# Patient Record
Sex: FEMALE | Race: WHITE | URBAN - METROPOLITAN AREA
[De-identification: names, ages, dates, MRNs, and addresses within clinical notes are randomized per-mention and may not be internally consistent; named-entity substitution may affect disease eponyms.]

---

## 2019-09-25 ENCOUNTER — IMPORTED ENCOUNTER (OUTPATIENT)
Dept: URBAN - METROPOLITAN AREA CLINIC 50 | Facility: CLINIC | Age: 54
End: 2019-09-25

## 2021-01-08 ENCOUNTER — IMPORTED ENCOUNTER (OUTPATIENT)
Dept: URBAN - METROPOLITAN AREA CLINIC 50 | Facility: CLINIC | Age: 56
End: 2021-01-08

## 2021-04-17 ASSESSMENT — VISUAL ACUITY
OD_BAT: 20/20
OS_CC: J1
OD_CC: J1
OD_OTHER: 20/20. 20/30.
OD_CC: 20/20
OS_CC: 20/20
OS_BAT: 20/20
OS_OTHER: 20/20. 20/25.

## 2021-04-17 ASSESSMENT — TONOMETRY
OS_IOP_MMHG: 18
OD_IOP_MMHG: 18

## 2021-06-24 NOTE — PATIENT DISCUSSION
Cataract surgery has been performed in the first eye and activities of daily living are still impaired. The patient would like to proceed with cataract surgery in the second eye as scheduled. The patient elects [facility] OS, goal of Qian.

## 2021-06-24 NOTE — PATIENT DISCUSSION
Cataract surgery has been performed in the first eye and activities of daily living are still impaired. The patient would like to proceed with cataract surgery in the second eye as scheduled. The patient elects [facility], goal of Qian. Path confirms adenocarcinoma   Hem/Onc eval Path confirms adenocarcinoma   Hem/Onc eval  Pfts as OP. R/o underlying Copd

## 2022-01-12 ENCOUNTER — PREPPED CHART (OUTPATIENT)
Dept: URBAN - METROPOLITAN AREA CLINIC 49 | Facility: CLINIC | Age: 57
End: 2022-01-12

## 2022-01-14 ENCOUNTER — COMPREHENSIVE EXAM (OUTPATIENT)
Dept: URBAN - METROPOLITAN AREA CLINIC 49 | Facility: CLINIC | Age: 57
End: 2022-01-14

## 2022-01-14 DIAGNOSIS — H52.4: ICD-10-CM

## 2022-01-14 DIAGNOSIS — Z01.01: ICD-10-CM

## 2022-01-14 DIAGNOSIS — D31.31: ICD-10-CM

## 2022-01-14 DIAGNOSIS — H25.13: ICD-10-CM

## 2022-01-14 PROCEDURE — 92014 COMPRE OPH EXAM EST PT 1/>: CPT

## 2022-01-14 PROCEDURE — 92015 DETERMINE REFRACTIVE STATE: CPT

## 2022-01-14 ASSESSMENT — KERATOMETRY
OD_K2POWER_DIOPTERS: 43.00
OS_K1POWER_DIOPTERS: 43.25
OD_K1POWER_DIOPTERS: 42.50
OS_K2POWER_DIOPTERS: 43.50
OD_AXISANGLE_DEGREES: 91
OD_AXISANGLE2_DEGREES: 001
OS_AXISANGLE2_DEGREES: 072
OS_AXISANGLE_DEGREES: 162

## 2022-01-14 ASSESSMENT — VISUAL ACUITY
OS_SC: J1+@17"
OD_CC: 20/25
OS_CC: J16@17"
OS_CC: 20/20-2
OS_GLARE: 20/20
OD_GLARE: 20/25
OS_GLARE: 20/20
OD_SC: J1+@17"
OD_GLARE: 20/40
OD_CC: J2@17"

## 2022-01-14 ASSESSMENT — TONOMETRY
OD_IOP_MMHG: 14
OS_IOP_MMHG: 16

## 2022-05-04 ENCOUNTER — COMPREHENSIVE EXAM (OUTPATIENT)
Dept: URBAN - METROPOLITAN AREA CLINIC 53 | Facility: CLINIC | Age: 57
End: 2022-05-04

## 2022-05-04 DIAGNOSIS — H25.13: ICD-10-CM

## 2022-05-04 DIAGNOSIS — D31.31: ICD-10-CM

## 2022-05-04 PROCEDURE — 92250 FUNDUS PHOTOGRAPHY W/I&R: CPT

## 2022-05-04 PROCEDURE — 92014 COMPRE OPH EXAM EST PT 1/>: CPT

## 2022-05-04 ASSESSMENT — KERATOMETRY
OS_AXISANGLE2_DEGREES: 072
OS_K2POWER_DIOPTERS: 43.50
OD_AXISANGLE_DEGREES: 91
OD_K2POWER_DIOPTERS: 43.00
OD_AXISANGLE2_DEGREES: 001
OS_K1POWER_DIOPTERS: 43.25
OD_K1POWER_DIOPTERS: 42.50
OS_AXISANGLE_DEGREES: 162

## 2022-05-04 ASSESSMENT — VISUAL ACUITY
OS_PH: 20/40
OS_SC: 20/150
OU_SC: J1+ @16IN
OD_SC: 20/100
OD_PH: 20/40

## 2022-05-04 ASSESSMENT — TONOMETRY
OD_IOP_MMHG: 16
OS_IOP_MMHG: 16

## 2022-05-09 NOTE — PROCEDURE NOTE: CLINICAL
PROCEDURE NOTE: Excision of Eyelid Lesion Right Lower Lid. Diagnosis: Eyelid Lesion, Benign. Anesthesia: Topical. Prep: Betadine Scrub. Risks, benefits and alternatives discussed. Patient desires to proceed with excision of growth/lesion today. A drop of proparacaine was instilled in the involved eye. A tetracaine drop was used on a cotton tipped applicator and placed on the conjunctiva. The involved area was anesthetized using 1% lidocaine with epi. The lesion was excised using mini Westcotts and forceps and discarded. The wound was cauterized to achieve hemostasis. Erythromycin ophthalmic ointment was applied. Post op instructions were given to the patient. The patient tolerated the procedure well and left in good condition. The patient understands to call us for any concerns. Dami Emery

## 2023-01-09 ENCOUNTER — COMPREHENSIVE EXAM (OUTPATIENT)
Dept: URBAN - METROPOLITAN AREA CLINIC 53 | Facility: CLINIC | Age: 58
End: 2023-01-09

## 2023-01-09 DIAGNOSIS — H52.4: ICD-10-CM

## 2023-01-09 DIAGNOSIS — H25.13: ICD-10-CM

## 2023-01-09 DIAGNOSIS — Z01.01: ICD-10-CM

## 2023-01-09 PROCEDURE — 92014 COMPRE OPH EXAM EST PT 1/>: CPT

## 2023-01-09 PROCEDURE — 92015 DETERMINE REFRACTIVE STATE: CPT

## 2023-01-09 ASSESSMENT — KERATOMETRY
OS_K2POWER_DIOPTERS: 43.50
OD_AXISANGLE_DEGREES: 91
OS_AXISANGLE2_DEGREES: 072
OD_AXISANGLE2_DEGREES: 001
OD_K2POWER_DIOPTERS: 43.00
OS_AXISANGLE_DEGREES: 162
OD_K1POWER_DIOPTERS: 42.50
OS_K1POWER_DIOPTERS: 43.25

## 2023-01-09 ASSESSMENT — VISUAL ACUITY
OU_CC: J1+ @ 18 IN
OS_CC: 20/25
OU_CC: 20/20
OS_GLARE: 20/20
OD_GLARE: 20/20
OS_GLARE: 20/20
OD_GLARE: 20/20
OD_CC: 20/25

## 2023-01-09 ASSESSMENT — TONOMETRY
OD_IOP_MMHG: 14
OS_IOP_MMHG: 15

## 2024-01-15 ENCOUNTER — COMPREHENSIVE EXAM (OUTPATIENT)
Dept: URBAN - METROPOLITAN AREA CLINIC 53 | Facility: CLINIC | Age: 59
End: 2024-01-15

## 2024-01-15 DIAGNOSIS — D31.31: ICD-10-CM

## 2024-01-15 DIAGNOSIS — Z01.01: ICD-10-CM

## 2024-01-15 DIAGNOSIS — H52.4: ICD-10-CM

## 2024-01-15 DIAGNOSIS — H25.13: ICD-10-CM

## 2024-01-15 PROCEDURE — 92014 COMPRE OPH EXAM EST PT 1/>: CPT

## 2024-01-15 PROCEDURE — 92015 DETERMINE REFRACTIVE STATE: CPT

## 2024-01-15 ASSESSMENT — KERATOMETRY
OS_K1POWER_DIOPTERS: 43.25
OD_AXISANGLE2_DEGREES: 175
OS_K2POWER_DIOPTERS: 43.25
OD_K1POWER_DIOPTERS: 42.75
OD_AXISANGLE_DEGREES: 085
OD_K2POWER_DIOPTERS: 43.00
OS_AXISANGLE_DEGREES: 180
OS_AXISANGLE2_DEGREES: 090

## 2024-01-15 ASSESSMENT — VISUAL ACUITY
OD_SC: J1+
OS_SC: J1+
OS_CC: 20/20
OD_GLARE: 20/20
OS_CC: J1+
OU_CC: J1+
OD_GLARE: 20/25
OU_SC: J1+
OU_CC: 20/20
OD_CC: 20/20-2
OD_SC: 20/150
OU_SC: 20/100-1
OS_GLARE: 20/20
OD_CC: J1+
OS_SC: 20/250
OS_GLARE: 20/25

## 2024-01-15 ASSESSMENT — TONOMETRY
OS_IOP_MMHG: 15
OD_IOP_MMHG: 15